# Patient Record
Sex: FEMALE | Race: WHITE | URBAN - METROPOLITAN AREA
[De-identification: names, ages, dates, MRNs, and addresses within clinical notes are randomized per-mention and may not be internally consistent; named-entity substitution may affect disease eponyms.]

---

## 2017-01-01 ENCOUNTER — HOSPITAL ENCOUNTER (OUTPATIENT)
Dept: LAB | Age: 82
Discharge: HOME | End: 2017-10-26
Payer: MEDICARE

## 2017-01-01 ENCOUNTER — PATIENT OUTREACH (OUTPATIENT)
Dept: FAMILY MEDICINE CLINIC | Age: 82
End: 2017-01-01

## 2017-01-01 ENCOUNTER — OFFICE VISIT (OUTPATIENT)
Dept: FAMILY MEDICINE CLINIC | Age: 82
End: 2017-01-01

## 2017-01-01 ENCOUNTER — TELEPHONE (OUTPATIENT)
Dept: FAMILY MEDICINE CLINIC | Age: 82
End: 2017-01-01

## 2017-01-01 VITALS
TEMPERATURE: 98.2 F | BODY MASS INDEX: 29.81 KG/M2 | HEIGHT: 62 IN | DIASTOLIC BLOOD PRESSURE: 58 MMHG | WEIGHT: 162 LBS | OXYGEN SATURATION: 97 % | RESPIRATION RATE: 16 BRPM | HEART RATE: 82 BPM | SYSTOLIC BLOOD PRESSURE: 110 MMHG

## 2017-01-01 VITALS
DIASTOLIC BLOOD PRESSURE: 60 MMHG | BODY MASS INDEX: 27.31 KG/M2 | OXYGEN SATURATION: 98 % | HEIGHT: 62 IN | HEART RATE: 82 BPM | WEIGHT: 148.4 LBS | RESPIRATION RATE: 20 BRPM | SYSTOLIC BLOOD PRESSURE: 100 MMHG | TEMPERATURE: 97.8 F

## 2017-01-01 VITALS
HEIGHT: 62 IN | RESPIRATION RATE: 20 BRPM | WEIGHT: 145 LBS | DIASTOLIC BLOOD PRESSURE: 70 MMHG | TEMPERATURE: 98.1 F | OXYGEN SATURATION: 97 % | BODY MASS INDEX: 26.68 KG/M2 | SYSTOLIC BLOOD PRESSURE: 128 MMHG | HEART RATE: 102 BPM

## 2017-01-01 DIAGNOSIS — R53.81 PHYSICAL DECONDITIONING: ICD-10-CM

## 2017-01-01 DIAGNOSIS — R10.32 LEFT LOWER QUADRANT PAIN: ICD-10-CM

## 2017-01-01 DIAGNOSIS — M15.9 PRIMARY OSTEOARTHRITIS INVOLVING MULTIPLE JOINTS: ICD-10-CM

## 2017-01-01 DIAGNOSIS — E03.9 HYPOTHYROIDISM (ACQUIRED): ICD-10-CM

## 2017-01-01 DIAGNOSIS — K52.9 COLITIS: Primary | ICD-10-CM

## 2017-01-01 DIAGNOSIS — Z79.899 HIGH RISK MEDICATION USE: ICD-10-CM

## 2017-01-01 DIAGNOSIS — R26.81 UNSTEADY GAIT: ICD-10-CM

## 2017-01-01 DIAGNOSIS — R63.4 UNINTENTIONAL WEIGHT LOSS: Primary | ICD-10-CM

## 2017-01-01 DIAGNOSIS — R19.7 DIARRHEA, UNSPECIFIED TYPE: ICD-10-CM

## 2017-01-01 DIAGNOSIS — I10 ESSENTIAL HYPERTENSION: Primary | ICD-10-CM

## 2017-01-01 DIAGNOSIS — K63.89 COLONIC MASS: ICD-10-CM

## 2017-01-01 DIAGNOSIS — Z00.00 ROUTINE GENERAL MEDICAL EXAMINATION AT A HEALTH CARE FACILITY: ICD-10-CM

## 2017-01-01 DIAGNOSIS — R19.04 LEFT LOWER QUADRANT ABDOMINAL MASS: ICD-10-CM

## 2017-01-01 DIAGNOSIS — R63.4 UNINTENTIONAL WEIGHT LOSS: ICD-10-CM

## 2017-01-01 LAB
ALBUMIN SERPL-MCNC: 2.4 G/DL (ref 3.4–5)
ALBUMIN/GLOB SERPL: 0.6 {RATIO} (ref 0.8–1.7)
ALP SERPL-CCNC: 90 U/L (ref 45–117)
ALT SERPL-CCNC: 28 U/L (ref 13–56)
ANION GAP SERPL CALC-SCNC: 11 MMOL/L (ref 3–18)
AST SERPL-CCNC: 32 U/L (ref 15–37)
BASOPHILS # BLD: 0 K/UL (ref 0–0.06)
BASOPHILS NFR BLD: 0 % (ref 0–2)
BILIRUB SERPL-MCNC: 0.6 MG/DL (ref 0.2–1)
BUN SERPL-MCNC: 26 MG/DL (ref 7–18)
BUN/CREAT SERPL: 18 (ref 12–20)
CALCIUM SERPL-MCNC: 8.3 MG/DL (ref 8.5–10.1)
CHLORIDE SERPL-SCNC: 98 MMOL/L (ref 100–108)
CO2 SERPL-SCNC: 28 MMOL/L (ref 21–32)
CREAT SERPL-MCNC: 1.46 MG/DL (ref 0.6–1.3)
DIFFERENTIAL METHOD BLD: ABNORMAL
EOSINOPHIL # BLD: 0 K/UL (ref 0–0.4)
EOSINOPHIL NFR BLD: 0 % (ref 0–5)
ERYTHROCYTE [DISTWIDTH] IN BLOOD BY AUTOMATED COUNT: 14.7 % (ref 11.6–14.5)
GLOBULIN SER CALC-MCNC: 4.2 G/DL (ref 2–4)
GLUCOSE SERPL-MCNC: 103 MG/DL (ref 74–99)
HCT VFR BLD AUTO: 34.7 % (ref 35–45)
HGB BLD-MCNC: 10.9 G/DL (ref 12–16)
LYMPHOCYTES # BLD: 2.9 K/UL (ref 0.9–3.6)
LYMPHOCYTES NFR BLD: 31 % (ref 21–52)
MCH RBC QN AUTO: 28.7 PG (ref 24–34)
MCHC RBC AUTO-ENTMCNC: 31.4 G/DL (ref 31–37)
MCV RBC AUTO: 91.3 FL (ref 74–97)
MONOCYTES # BLD: 0.8 K/UL (ref 0.05–1.2)
MONOCYTES NFR BLD: 9 % (ref 3–10)
NEUTS SEG # BLD: 5.5 K/UL (ref 1.8–8)
NEUTS SEG NFR BLD: 60 % (ref 40–73)
PLATELET # BLD AUTO: 369 K/UL (ref 135–420)
PMV BLD AUTO: 10.9 FL (ref 9.2–11.8)
POTASSIUM SERPL-SCNC: 3.4 MMOL/L (ref 3.5–5.5)
PROT SERPL-MCNC: 6.6 G/DL (ref 6.4–8.2)
RBC # BLD AUTO: 3.8 M/UL (ref 4.2–5.3)
SODIUM SERPL-SCNC: 137 MMOL/L (ref 136–145)
TSH SERPL DL<=0.05 MIU/L-ACNC: 2.55 UIU/ML (ref 0.36–3.74)
WBC # BLD AUTO: 9.2 K/UL (ref 4.6–13.2)

## 2017-01-01 PROCEDURE — 84443 ASSAY THYROID STIM HORMONE: CPT | Performed by: INTERNAL MEDICINE

## 2017-01-01 PROCEDURE — 80053 COMPREHEN METABOLIC PANEL: CPT | Performed by: INTERNAL MEDICINE

## 2017-01-01 PROCEDURE — 36415 COLL VENOUS BLD VENIPUNCTURE: CPT | Performed by: INTERNAL MEDICINE

## 2017-01-01 PROCEDURE — 85025 COMPLETE CBC W/AUTO DIFF WBC: CPT | Performed by: INTERNAL MEDICINE

## 2017-01-01 RX ORDER — OXYCODONE AND ACETAMINOPHEN 5; 325 MG/1; MG/1
1 TABLET ORAL
Qty: 30 TAB | Refills: 0 | Status: SHIPPED | OUTPATIENT
Start: 2017-01-01 | End: 2017-01-01 | Stop reason: SDUPTHER

## 2017-01-01 RX ORDER — AMOXICILLIN AND CLAVULANATE POTASSIUM 500; 125 MG/1; MG/1
1 TABLET, FILM COATED ORAL EVERY 12 HOURS
COMMUNITY
Start: 2017-01-01 | End: 2017-01-01

## 2017-01-01 RX ORDER — SULFAMETHOXAZOLE AND TRIMETHOPRIM 400; 80 MG/1; MG/1
1 TABLET ORAL EVERY 12 HOURS
COMMUNITY
Start: 2017-01-01 | End: 2017-01-01

## 2017-01-01 RX ORDER — LEVOTHYROXINE SODIUM 50 UG/1
50 TABLET ORAL
Qty: 90 TAB | Refills: 3 | Status: SHIPPED | OUTPATIENT
Start: 2017-01-01 | End: 2018-01-01 | Stop reason: SDUPTHER

## 2017-01-01 RX ORDER — OXYCODONE AND ACETAMINOPHEN 5; 325 MG/1; MG/1
1 TABLET ORAL
Qty: 30 TAB | Refills: 0 | Status: SHIPPED | OUTPATIENT
Start: 2017-01-01 | End: 2018-01-01 | Stop reason: SDUPTHER

## 2017-01-01 RX ORDER — METRONIDAZOLE 500 MG/1
500 TABLET ORAL EVERY 8 HOURS
COMMUNITY
Start: 2017-01-01 | End: 2017-01-01

## 2017-01-10 RX ORDER — LEVOTHYROXINE SODIUM 50 UG/1
50 TABLET ORAL
Qty: 90 TAB | Refills: 0 | Status: SHIPPED | OUTPATIENT
Start: 2017-01-10 | End: 2017-04-12 | Stop reason: SDUPTHER

## 2017-01-10 NOTE — TELEPHONE ENCOUNTER
From: Beverly Schwartz  To: Luisa Belle MD  Sent: 1/9/2017 10:02 AM EST  Subject: Medication Renewal Request    Original authorizing provider: MD Beverly Tomas would like a refill of the following medications:  levothyroxine (SYNTHROID) 50 mcg tablet Luisa Belle MD]    Preferred pharmacy: [address] AT [address] [place] [address]    Comment:

## 2017-01-19 ENCOUNTER — HOSPITAL ENCOUNTER (OUTPATIENT)
Dept: LAB | Age: 82
Discharge: HOME | End: 2017-01-19
Payer: MEDICARE

## 2017-01-19 ENCOUNTER — OFFICE VISIT (OUTPATIENT)
Dept: FAMILY MEDICINE CLINIC | Age: 82
End: 2017-01-19

## 2017-01-19 VITALS
SYSTOLIC BLOOD PRESSURE: 122 MMHG | TEMPERATURE: 97.6 F | HEART RATE: 87 BPM | OXYGEN SATURATION: 95 % | WEIGHT: 157.4 LBS | BODY MASS INDEX: 28.97 KG/M2 | DIASTOLIC BLOOD PRESSURE: 76 MMHG | HEIGHT: 62 IN | RESPIRATION RATE: 20 BRPM

## 2017-01-19 DIAGNOSIS — I10 ESSENTIAL HYPERTENSION: ICD-10-CM

## 2017-01-19 DIAGNOSIS — N17.9 AKI (ACUTE KIDNEY INJURY) (HCC): ICD-10-CM

## 2017-01-19 DIAGNOSIS — M19.90 OSTEOARTHRITIS, UNSPECIFIED OSTEOARTHRITIS TYPE, UNSPECIFIED SITE: ICD-10-CM

## 2017-01-19 DIAGNOSIS — N17.9 AKI (ACUTE KIDNEY INJURY) (HCC): Primary | ICD-10-CM

## 2017-01-19 LAB
ANION GAP BLD CALC-SCNC: 11 MMOL/L (ref 3–18)
BUN SERPL-MCNC: 29 MG/DL (ref 7–18)
BUN/CREAT SERPL: 20 (ref 12–20)
CALCIUM SERPL-MCNC: 9.3 MG/DL (ref 8.5–10.1)
CHLORIDE SERPL-SCNC: 98 MMOL/L (ref 100–108)
CO2 SERPL-SCNC: 28 MMOL/L (ref 21–32)
CREAT SERPL-MCNC: 1.46 MG/DL (ref 0.6–1.3)
GLUCOSE SERPL-MCNC: 107 MG/DL (ref 74–99)
POTASSIUM SERPL-SCNC: 3.8 MMOL/L (ref 3.5–5.5)
SODIUM SERPL-SCNC: 137 MMOL/L (ref 136–145)

## 2017-01-19 PROCEDURE — 36415 COLL VENOUS BLD VENIPUNCTURE: CPT | Performed by: INTERNAL MEDICINE

## 2017-01-19 PROCEDURE — 80048 BASIC METABOLIC PNL TOTAL CA: CPT | Performed by: INTERNAL MEDICINE

## 2017-01-19 RX ORDER — OXYCODONE AND ACETAMINOPHEN 5; 325 MG/1; MG/1
1 TABLET ORAL
Qty: 30 TAB | Refills: 0 | Status: SHIPPED | OUTPATIENT
Start: 2017-01-19 | End: 2017-04-12 | Stop reason: SDUPTHER

## 2017-01-19 NOTE — MR AVS SNAPSHOT
Visit Information Date & Time Provider Department Dept. Phone Encounter #  
 1/19/2017  1:00 PM Domi Navarro, [facility] [phone] [id number] Upcoming Health Maintenance Date Due  
 GLAUCOMA SCREENING Q2Y 6/17/2017 Pneumococcal 65+ Low/Medium Risk (2 of 2 - PPSV23) 11/1/2017 MEDICARE YEARLY EXAM 12/20/2017 DTaP/Tdap/Td series (2 - Td) 1/19/2027 Allergies as of 1/19/2017  Review Complete On: 1/19/2017 By: Domi Navarro MD  
  
 Severity Noted Reaction Type Reactions Ace Inhibitors  12/19/2016    Cough Current Immunizations  Reviewed on 9/2/2015 Name Date Influenza High Dose Vaccine PF 8/30/2016 10:42 AM, 9/2/2015 10:38 AM  
  
 Not reviewed this visit You Were Diagnosed With   
  
 Codes Comments [facility]    -  Primary ICD-10-CM: N17.9 ICD-9-CM: 584.9 Essential hypertension     ICD-10-CM: I10 
ICD-9-CM: 401.9 Vitals BP Pulse Temp Resp Height(growth percentile) Weight(growth percentile) 122/76 87 97.6 °F (36.4 °C) 20 5' 2\" (1.575 m) 157 lb 6.4 oz (71.4 kg) SpO2 BMI OB Status Smoking Status 95% 28.79 kg/m2 Postmenopausal Former Smoker Vitals History BMI and BSA Data Body Mass Index Body Surface Area 28.79 kg/m 2 1.77 m 2 Preferred Pharmacy Pharmacy Name Phone [facility] [phone] [facility] [address]  [address] [phone] Your Updated Medication List  
  
   
This list is accurate as of: 1/19/17  1:18 PM.  Always use your most recent med list.  
  
  
  
  
 aspirin delayed-release 81 mg tablet Take 1 Tab by mouth daily. carvedilol 6.25 mg tablet Commonly known as:  Kenyatta Golas Take  by mouth two (2) times daily (with meals). furosemide 40 mg tablet Commonly known as:  LASIX Take  by mouth daily. levothyroxine 50 mcg tablet Commonly known as:  SYNTHROID  
 Take 1 Tab by mouth Daily (before breakfast). losartan 25 mg tablet Commonly known as:  COZAAR Take  by mouth daily. MULTIPLE VITAMIN, WOMENS Tab Generic drug:  multivitamins-ca-iron-minerals Take  by mouth. oxyCODONE-acetaminophen 5-325 mg per tablet Commonly known as:  PERCOCET Take 1 Tab by mouth daily as needed for Pain. ZOCOR 20 mg tablet Generic drug:  simvastatin Take  by mouth nightly. To-Do List   
 01/19/2017 Lab:  METABOLIC PANEL, BASIC Introducing \[facility]\"" & HEALTH SERVICES! Dear Kj Felipe: 
Thank you for requesting a H[employer] account. Our records indicate that you already have an active HireHive account. You can access your account anytime at [email or web address] The Simple[employer] Fast and Elegant Tablet [employer] App". [employer]/"[employer] The Simple, Fast and Elegant Tablet [employer] App" Did you know that you can access your hospital and ER discharge instructions at any time in HireHive? You can also review all of your test results from your hospital stay or ER visit. Additional Information If you have questions, please visit the Frequently Asked Questions section of the HireHive website at [email or web address]/"[employer] The Simple, Fast and Elegant Tablet [employer] App"/. Remember, HireHive is NOT to be used for urgent needs. For medical emergencies, dial 911. Now available from your iPhone and Android! Please provide this summary of care documentation to your next provider. Your primary care clinician is listed as Nanda Smith. If you have any questions after today's visit, please call [phone].

## 2017-01-19 NOTE — PROGRESS NOTES
Estiven Schuster is a 80 y.o. female here today for 1 month follow-up for HTN. 1. Have you been to the ER, urgent care clinic since your last visit? Hospitalized since your last visit? No    2. Have you seen or consulted any other health care providers outside of the [address] [place] [address] since your last visit? Include any pap smears or colon screening.  Yes Where: Cardiologist

## 2017-01-19 NOTE — PROGRESS NOTES
Assessment/Plan:    1. ANANT (acute kidney injury) (HCC)-recheck labs  - METABOLIC PANEL, BASIC; Future    2. Essential hypertension  -cont current    3. Osteoarthritis, unspecified osteoarthritis type, unspecified site  -refilled. [facility]  [facility] and is in accordance with patient's prescriptions   - oxyCODONE-acetaminophen (PERCOCET) 5-325 mg per tablet; Take 1 Tab by mouth daily as needed for Pain. Dispense: 30 Tab; Refill: 0    The plan was discussed with the patient. The patient verbalized understanding and is in agreement with the plan. All medication potential side effects were discussed with the patient. Health Maintenance:   Health Maintenance   Topic Date Due    GLAUCOMA SCREENING Q2Y  06/17/2017    Pneumococcal 65+ Low/Medium Risk (2 of 2 - PPSV23) 11/01/2017    MEDICARE YEARLY EXAM  12/20/2017    DTaP/Tdap/Td series (2 - Td) 01/19/2027    OSTEOPOROSIS SCREENING (DEXA)  Completed    ZOSTER VACCINE AGE 60>  Completed    INFLUENZA AGE 9 TO ADULT  Completed       Kali Andrews is a 80 y.o. female and presents with Hypertension     Subjective:  MDD- started on wellbutrin at last visit. However, she had nausea and abd pain, so she didn't start it. She states she feels better now. She thinks it was just from the holidays and being in the hospital.    ROS:  Constitutional: No recent weight change. No weakness/fatigue. No f/c. Cardiovascular: No CP/palpitations. No RODRIGEZ/orthopnea/PND. Respiratory: No cough/sputum, dyspnea, wheezing. Gastointestinal: No dysphagia, reflux. No n/v. No constipation/diarrhea. No melena/rectal bleeding. Psychiatric:  No depression, anxiety. The problem list was updated as a part of today's visit.   Patient Active Problem List   Diagnosis Code    HTN (hypertension) I10    HLD (hyperlipidemia) E78.5    Osteopenia M85.80    Vitamin D deficiency E55.9    Post herpetic neuralgia B02.29    Basal cell cancer C44.91    Refusal of statin medication by patient Z53.29    Subclinical hypothyroidism E03.9    Hearing loss H91.90    Hypothyroidism (acquired) E03.9    Essential hypertension I10    Acute on chronic systolic congestive heart failure (HCC) I50.23       The PSH, FH were reviewed. SH:  Social History   Substance Use Topics    Smoking status: Former Smoker     Packs/day: 1.00     Years: 50.00     Start date: 1/1/1944     Quit date: 1/1/1994    Smokeless tobacco: Never Used    Alcohol use 0.0 oz/week     0 Standard drinks or equivalent per week      Comment: seldom       Medications/Allergies:  Current Outpatient Prescriptions on File Prior to Visit   Medication Sig Dispense Refill    levothyroxine (SYNTHROID) 50 mcg tablet Take 1 Tab by mouth Daily (before breakfast). 90 Tab 0    losartan (COZAAR) 25 mg tablet Take  by mouth daily.  buPROPion XL (WELLBUTRIN XL) 150 mg tablet Take 1 Tab by mouth every morning. 30 Tab 0    oxyCODONE-acetaminophen (PERCOCET) 5-325 mg per tablet Take 1 Tab by mouth daily as needed for Pain. 30 Tab 0    furosemide (LASIX) 40 mg tablet Take  by mouth daily.  carvedilol (COREG) 6.25 mg tablet Take  by mouth two (2) times daily (with meals).  simvastatin (ZOCOR) 20 mg tablet Take  by mouth nightly.  multivitamins-ca-iron-minerals (MULTIPLE VITAMIN, WOMENS) tab Take  by mouth.  aspirin delayed-release 81 mg tablet Take 1 Tab by mouth daily. 30 Tab      No current facility-administered medications on file prior to visit. Allergies   Allergen Reactions    Ace Inhibitors Cough       Objective:  Visit Vitals    /76    Pulse 87    Temp 97.6 °F (36.4 °C)    Resp 20    Ht 5' 2\" (1.575 m)    Wt 157 lb 6.4 oz (71.4 kg)    SpO2 95%    BMI 28.79 kg/m2      Constitutional: Well developed, nourished, no distress, alert   CV: S1, S2.  RRR. No murmurs/rubs. No thrills palpated. No carotid bruits. Intact distal pulses. No edema. Pulm: No abnormalities on inspection.   Clear to auscultation bilaterally. No wheezing/rhonchi. Normal effort. Labwork and Ancillary Studies:      Basic Metabolic Profile/LFTs  Lab Results   Component Value Date/Time    Sodium 142 10/19/2016 11:11 AM    Potassium 4.2 10/19/2016 11:11 AM    Chloride 105 10/19/2016 11:11 AM    CO2 27 10/19/2016 11:11 AM    Anion gap 10 10/19/2016 11:11 AM    Glucose 101 10/19/2016 11:11 AM    BUN 23 10/19/2016 11:11 AM    Creatinine 1.40 10/19/2016 11:11 AM    BUN/Creatinine ratio 16 10/19/2016 11:11 AM    GFR est AA 43 10/19/2016 11:11 AM    GFR est non-AA 35 10/19/2016 11:11 AM    Calcium 9.5 10/19/2016 11:11 AM      Lab Results   Component Value Date/Time    ALT 21 11/18/2015 12:00 PM    AST 18 11/18/2015 12:00 PM    Alk.  phosphatase 98 11/18/2015 12:00 PM    Bilirubin, total 0.6 11/18/2015 12:00 PM

## 2017-04-12 DIAGNOSIS — M19.90 OSTEOARTHRITIS, UNSPECIFIED OSTEOARTHRITIS TYPE, UNSPECIFIED SITE: ICD-10-CM

## 2017-04-12 RX ORDER — LEVOTHYROXINE SODIUM 50 UG/1
50 TABLET ORAL
Qty: 30 TAB | Refills: 0 | Status: SHIPPED | OUTPATIENT
Start: 2017-04-12 | End: 2017-04-14 | Stop reason: SDUPTHER

## 2017-04-12 RX ORDER — OXYCODONE AND ACETAMINOPHEN 5; 325 MG/1; MG/1
1 TABLET ORAL
Qty: 30 TAB | Refills: 0 | Status: SHIPPED | OUTPATIENT
Start: 2017-04-12 | End: 2017-01-01 | Stop reason: SDUPTHER

## 2017-04-13 ENCOUNTER — HOSPITAL ENCOUNTER (OUTPATIENT)
Dept: LAB | Age: 82
Discharge: HOME | End: 2017-04-13
Payer: MEDICARE

## 2017-04-13 DIAGNOSIS — R53.83 FATIGUE, UNSPECIFIED TYPE: ICD-10-CM

## 2017-04-13 LAB
ANION GAP BLD CALC-SCNC: 9 MMOL/L (ref 3–18)
BUN SERPL-MCNC: 31 MG/DL (ref 7–18)
BUN/CREAT SERPL: 22 (ref 12–20)
CALCIUM SERPL-MCNC: 9.4 MG/DL (ref 8.5–10.1)
CHLORIDE SERPL-SCNC: 105 MMOL/L (ref 100–108)
CO2 SERPL-SCNC: 29 MMOL/L (ref 21–32)
CREAT SERPL-MCNC: 1.42 MG/DL (ref 0.6–1.3)
ERYTHROCYTE [DISTWIDTH] IN BLOOD BY AUTOMATED COUNT: 14.6 % (ref 11.6–14.5)
GLUCOSE SERPL-MCNC: 97 MG/DL (ref 74–99)
HCT VFR BLD AUTO: 35.7 % (ref 35–45)
HGB BLD-MCNC: 11.1 G/DL (ref 12–16)
MCH RBC QN AUTO: 29.5 PG (ref 24–34)
MCHC RBC AUTO-ENTMCNC: 31.1 G/DL (ref 31–37)
MCV RBC AUTO: 94.9 FL (ref 74–97)
PLATELET # BLD AUTO: 245 K/UL (ref 135–420)
PMV BLD AUTO: 11.2 FL (ref 9.2–11.8)
POTASSIUM SERPL-SCNC: 3.8 MMOL/L (ref 3.5–5.5)
RBC # BLD AUTO: 3.76 M/UL (ref 4.2–5.3)
SODIUM SERPL-SCNC: 143 MMOL/L (ref 136–145)
TSH SERPL DL<=0.05 MIU/L-ACNC: 2.44 UIU/ML (ref 0.36–3.74)
WBC # BLD AUTO: 9.1 K/UL (ref 4.6–13.2)

## 2017-04-13 PROCEDURE — 84443 ASSAY THYROID STIM HORMONE: CPT | Performed by: INTERNAL MEDICINE

## 2017-04-13 PROCEDURE — 80048 BASIC METABOLIC PNL TOTAL CA: CPT | Performed by: INTERNAL MEDICINE

## 2017-04-13 PROCEDURE — 85027 COMPLETE CBC AUTOMATED: CPT | Performed by: INTERNAL MEDICINE

## 2017-04-13 PROCEDURE — 36415 COLL VENOUS BLD VENIPUNCTURE: CPT | Performed by: INTERNAL MEDICINE

## 2017-04-14 RX ORDER — LEVOTHYROXINE SODIUM 50 UG/1
50 TABLET ORAL
Qty: 90 TAB | Refills: 3 | Status: SHIPPED | OUTPATIENT
Start: 2017-04-14 | End: 2017-01-01 | Stop reason: SDUPTHER

## 2017-05-05 NOTE — TELEPHONE ENCOUNTER
From: Haydee Romano  To: Kathy Mao MD  Sent: 5/5/2017 2:36 PM EDT  Subject: Medication Renewal Request    Original authorizing provider: MD Haydee Blank would like a refill of the following medications:  levothyroxine (SYNTHROID) 50 mcg tablet Kathy Mao MD]    Preferred pharmacy: [address] AT [address] [place] [address]    Comment:

## 2017-07-12 PROBLEM — M15.9 PRIMARY OSTEOARTHRITIS INVOLVING MULTIPLE JOINTS: Status: ACTIVE | Noted: 2017-01-01

## 2017-07-12 PROBLEM — Z79.899 CONTROLLED SUBSTANCE AGREEMENT SIGNED: Status: ACTIVE | Noted: 2017-01-01

## 2017-07-12 NOTE — MR AVS SNAPSHOT
Visit Information Date & Time Provider Department Dept. Phone Encounter #  
 7/12/2017  1:00 PM Jose Cortez, Abelardo [facility] [phone] [id number] Upcoming Health Maintenance Date Due  
 GLAUCOMA SCREENING Q2Y 6/17/2017 INFLUENZA AGE 9 TO ADULT 8/1/2017 Pneumococcal 65+ Low/Medium Risk (2 of 2 - PPSV23) 11/1/2017 MEDICARE YEARLY EXAM 12/20/2017 DTaP/Tdap/Td series (2 - Td) 1/19/2027 Allergies as of 7/12/2017  Review Complete On: 7/12/2017 By: Dima Gasca LPN Severity Noted Reaction Type Reactions Ace Inhibitors  12/19/2016    Cough Current Immunizations  Reviewed on 9/2/2015 Name Date Influenza High Dose Vaccine PF 8/30/2016 10:42 AM, 9/2/2015 10:38 AM  
  
 Not reviewed this visit You Were Diagnosed With   
  
 Codes Comments Essential hypertension    -  Primary ICD-10-CM: I10 
ICD-9-CM: 401.9 Primary osteoarthritis involving multiple joints     ICD-10-CM: M15.0 ICD-9-CM: 715.09 Vitals BP Pulse Temp Resp Height(growth percentile) Weight(growth percentile) 110/58 82 98.2 °F (36.8 °C) 16 5' 2\" (1.575 m) 162 lb (73.5 kg) SpO2 BMI OB Status Smoking Status 97% 29.63 kg/m2 Postmenopausal Former Smoker Vitals History BMI and BSA Data Body Mass Index Body Surface Area  
 29.63 kg/m 2 1.79 m 2 Preferred Pharmacy Pharmacy Name Phone [facility] [phone]  [address] [phone] Your Updated Medication List  
  
   
This list is accurate as of: 7/12/17  1:15 PM.  Always use your most recent med list.  
  
  
  
  
 aspirin delayed-release 81 mg tablet Take 1 Tab by mouth daily. carvedilol 6.25 mg tablet Commonly known as:  Carrolyn Peabody Take  by mouth two (2) times daily (with meals). furosemide 40 mg tablet Commonly known as:  LASIX Take  by mouth daily. levothyroxine 50 mcg tablet Commonly known as:  SYNTHROID Take 1 Tab by mouth Daily (before breakfast). losartan 25 mg tablet Commonly known as:  COZAAR Take  by mouth daily. MULTIPLE VITAMIN, WOMENS Tab Generic drug:  multivitamins-ca-iron-minerals Take  by mouth. oxyCODONE-acetaminophen 5-325 mg per tablet Commonly known as:  PERCOCET Take 1 Tab by mouth daily as needed for Pain. ZOCOR 20 mg tablet Generic drug:  simvastatin Take  by mouth nightly. Prescriptions Printed Refills  
 oxyCODONE-acetaminophen (PERCOCET) 5-325 mg per tablet 0 Sig: Take 1 Tab by mouth daily as needed for Pain. Class: Print Route: Oral  
  
Introducing [facility] & HEALTH SERVICES! Dear Jany Frye: 
Thank you for requesting a [employer] account. Our records indicate that you already have an active [employer] account. You can access your account anytime at [email or web address] Did you know that you can access your hospital and ER discharge instructions at any time in ChipCare? You can also review all of your test results from your hospital stay or ER visit. Additional Information If you have questions, please visit the Frequently Asked Questions section of the [employer] website at [email or web address] Remember, ChipCare is NOT to be used for urgent needs. For medical emergencies, dial 911. Now available from your iPhone and Android! Please provide this summary of care documentation to your next provider. Your primary care clinician is listed as Nanda Smith. If you have any questions after today's visit, please call [phone].

## 2017-07-12 NOTE — PROGRESS NOTES
Assessment/Plan:    1. Essential hypertension  -controlled. No sx of acute CHF, appears to at her baseline. 2. Primary osteoarthritis involving multiple joints-refilled. Controlled substance contract signed. - oxyCODONE-acetaminophen (PERCOCET) 5-325 mg per tablet; Take 1 Tab by mouth daily as needed for Pain. Dispense: 30 Tab; Refill: 0    The plan was discussed with the patient. The patient verbalized understanding and is in agreement with the plan. All medication potential side effects were discussed with the patient. Health Maintenance:   Health Maintenance   Topic Date Due    GLAUCOMA SCREENING Q2Y  06/17/2017    INFLUENZA AGE 9 TO ADULT  08/01/2017    Pneumococcal 65+ Low/Medium Risk (2 of 2 - PPSV23) 11/01/2017    MEDICARE YEARLY EXAM  12/20/2017    DTaP/Tdap/Td series (2 - Td) 01/19/2027    OSTEOPOROSIS SCREENING (DEXA)  Completed    ZOSTER VACCINE AGE 60>  Completed       Karol Leahy is a 80 y.o. female and presents with Follow Up Chronic Condition     Subjective:  OA_ uses percocet sparingly, not every day. HTN - bp controlled. Does have significant CHF, EF 20. Notes chronic stable RODRIGEZ and PND, 2 pillow orthopnea x 1 year. ROS:  Constitutional: No recent weight change. No weakness/fatigue. No f/c. Skin: No rashes, change in nails/hair, itching   HENT: No HA, dizziness. No hearing loss/tinnitus. No nasal congestion/discharge. Eyes: No change in vision, double/blurred vision or eye pain/redness. Cardiovascular: No CP/palpitations.  + RODRIGEZ/+orthopnea/+PND. Respiratory: No cough/sputum, dyspnea, wheezing. Gastointestinal: No dysphagia, reflux. No n/v. No constipation/diarrhea. No melena/rectal bleeding. Genitourinary: No dysuria, urinary hesitancy, nocturia, hematuria. No incontinence. Musculoskeletal: + joint pain/stiffness. No muscle pain/tenderness. Endo: No heat/cold intolerance, no polyuria/polydypsia. Heme: No h/o anemia.   No easy bleeding/bruising. Allergy/Immunology: No seasonal rhinitis. Denies frequent colds, sinus/ear infections. Neurological: No seizures/numbness/weakness. No paresthesias. Psychiatric:  No depression, anxiety. The problem list was updated as a part of today's visit. Patient Active Problem List   Diagnosis Code    HTN (hypertension) I10    HLD (hyperlipidemia) E78.5    Osteopenia M85.80    Vitamin D deficiency E55.9    Post herpetic neuralgia B02.29    Basal cell cancer C44.91    Refusal of statin medication by patient Z53.29    Subclinical hypothyroidism E03.9    Hearing loss H91.90    Hypothyroidism (acquired) E03.9    Essential hypertension I10    Acute on chronic systolic congestive heart failure (HCC) I50.23     The PSH, FH were reviewed. SH:  Social History   Substance Use Topics    Smoking status: Former Smoker     Packs/day: 1.00     Years: 50.00     Start date: 1/1/1944     Quit date: 1/1/1994    Smokeless tobacco: Never Used    Alcohol use 0.0 oz/week     0 Standard drinks or equivalent per week      Comment: seldom       Medications/Allergies:  Current Outpatient Prescriptions on File Prior to Visit   Medication Sig Dispense Refill    levothyroxine (SYNTHROID) 50 mcg tablet Take 1 Tab by mouth Daily (before breakfast). 90 Tab 3    oxyCODONE-acetaminophen (PERCOCET) 5-325 mg per tablet Take 1 Tab by mouth daily as needed for Pain. 30 Tab 0    losartan (COZAAR) 25 mg tablet Take  by mouth daily.  furosemide (LASIX) 40 mg tablet Take  by mouth daily.  carvedilol (COREG) 6.25 mg tablet Take  by mouth two (2) times daily (with meals).  simvastatin (ZOCOR) 20 mg tablet Take  by mouth nightly.  multivitamins-ca-iron-minerals (MULTIPLE VITAMIN, WOMENS) tab Take  by mouth.  aspirin delayed-release 81 mg tablet Take 1 Tab by mouth daily. 30 Tab      No current facility-administered medications on file prior to visit.          Allergies   Allergen Reactions    Ace Inhibitors Cough       Objective:  Visit Vitals    /58    Pulse 82    Temp 98.2 °F (36.8 °C)    Resp 16    Ht 5' 2\" (1.575 m)    Wt 162 lb (73.5 kg)    SpO2 97%    BMI 29.63 kg/m2      Constitutional: Well developed, nourished, no distress, alert, obese habitus   CV: S1, S2.  RRR. No murmurs/rubs. No thrills palpated. No carotid bruits. Intact distal pulses. No edema. Pulm: No abnormalities on inspection. Clear to auscultation bilaterally. No wheezing/rhonchi. Normal effort. GI: Soft, nontender, nondistended. Normal active bowel sounds. Neuro: A/O x 3. No focal motor or sensory deficits.  Speech normal.

## 2017-07-12 NOTE — PROGRESS NOTES
Rashmi Samaniego is a 80 y.o. female here today for follow-up. 1. Have you been to the ER, urgent care clinic since your last visit? Hospitalized since your last visit? No    2. Have you seen or consulted any other health care providers outside of the [address] [place] [address] since your last visit? Include any pap smears or colon screening.  Yes Reason for visit: cardiology

## 2017-09-20 NOTE — TELEPHONE ENCOUNTER
From: Lj Adames  To: Betsey Mc MD  Sent: 9/20/2017 9:16 AM EDT  Subject: Medication Renewal Request    Original authorizing provider: MD Lj Sparks would like a refill of the following medications:  oxyCODONE-acetaminophen (PERCOCET) 5-325 mg per tablet Betsey Mc MD]    Preferred pharmacy: [address] AT [address] [place] [address]    Comment:

## 2017-10-26 NOTE — PROGRESS NOTES
Assessment/Plan:    1. Unintentional weight loss, diarrhea, LLQ abd pain and L sided abdominal mass- ddx includes colitis (ischemic or infectious) vs diverticulitis vs malignancy. Ck labs and STAT CT abd/pelvis. Consider mesenteric PVLs. - METABOLIC PANEL, COMPREHENSIVE; Future  - CBC WITH AUTOMATED DIFF; Future  - CT ABD PELV W WO CONT; Future  - TSH 3RD GENERATION; Future  - C. DIFFICILE/EPI PCR ([facility] Only); Future    2. Primary osteoarthritis involving multiple joints  -refilled  - oxyCODONE-acetaminophen (PERCOCET) 5-325 mg per tablet; Take 1 Tab by mouth daily as needed for Pain. Dispense: 30 Tab; Refill: 0    The plan was discussed with the patient. The patient verbalized understanding and is in agreement with the plan. All medication potential side effects were discussed with the patient. Health Maintenance:   Health Maintenance   Topic Date Due    GLAUCOMA SCREENING Q2Y  06/17/2017    INFLUENZA AGE 9 TO ADULT  08/01/2017    Pneumococcal 65+ Low/Medium Risk (2 of 2 - PPSV23) 11/01/2017    MEDICARE YEARLY EXAM  12/20/2017    DTaP/Tdap/Td series (2 - Td) 01/19/2027    OSTEOPOROSIS SCREENING (DEXA)  Completed    ZOSTER VACCINE AGE 60>  Completed       Shazia Angel is a 80 y.o. female and presents with Abdominal Pain (When eating/drinking); Extremity Weakness; Diarrhea; Nausea; Weight Loss; and Breathing Problem     Subjective:  Pt c/o couple week h/o nausea, L sided abd pain, watery, non-bloody diarrhea (after every meal). Seems to be triggered with eating. She has lost 16lb in 5 weeks, according to her son. +chills and night sweats. No vomiting. No recent antibiotic use. She has bowel movements/diarrhea that wake her up in the middle of the night. Her pain is 10/10. ROS:  Constitutional: No recent weight change. No weakness/fatigue.  + f/c. HENT: No HA, dizziness. No hearing loss/tinnitus. No nasal congestion/discharge.    Eyes: No change in vision, double/blurred vision or eye pain/redness. Cardiovascular: No CP/palpitations. No RODRIGEZ/orthopnea/PND. Respiratory: + cough/no sputum, dyspnea, wheezing. Gastointestinal: No dysphagia, reflux. +nausea. +diarrhea. No melena/rectal bleeding. Genitourinary: No dysuria, urinary hesitancy, nocturia, hematuria. No incontinence. Endo: No heat/cold intolerance, +polydypsia. The problem list was updated as a part of today's visit. Patient Active Problem List   Diagnosis Code    HLD (hyperlipidemia) E78.5    Osteopenia M85.80    Vitamin D deficiency E55.9    Post herpetic neuralgia B02.29    Basal cell cancer C44.91    Refusal of statin medication by patient Z53.29    Hearing loss H91.90    Hypothyroidism (acquired) E03.9    Essential hypertension I10    Acute on chronic systolic congestive heart failure (HCC) I50.23    Primary osteoarthritis involving multiple joints M15.0    Controlled substance agreement signed Z79.899       The PSH, FH were reviewed. SH:  Social History   Substance Use Topics    Smoking status: Former Smoker     Packs/day: 1.00     Years: 50.00     Start date: 1/1/1944     Quit date: 1/1/1994    Smokeless tobacco: Never Used    Alcohol use 0.0 oz/week     0 Standard drinks or equivalent per week      Comment: seldom       Medications/Allergies:  Current Outpatient Prescriptions on File Prior to Visit   Medication Sig Dispense Refill    oxyCODONE-acetaminophen (PERCOCET) 5-325 mg per tablet Take 1 Tab by mouth daily as needed for Pain. 30 Tab 0    levothyroxine (SYNTHROID) 50 mcg tablet Take 1 Tab by mouth Daily (before breakfast). 90 Tab 3    losartan (COZAAR) 25 mg tablet Take  by mouth daily.  furosemide (LASIX) 40 mg tablet Take  by mouth daily.  carvedilol (COREG) 6.25 mg tablet Take  by mouth two (2) times daily (with meals).  simvastatin (ZOCOR) 20 mg tablet Take  by mouth nightly.  multivitamins-ca-iron-minerals (MULTIPLE VITAMIN, WOMENS) tab Take  by mouth.       aspirin delayed-release 81 mg tablet Take 1 Tab by mouth daily. 30 Tab      No current facility-administered medications on file prior to visit. Allergies   Allergen Reactions    Ace Inhibitors Cough       Objective:  Visit Vitals    /60 (BP 1 Location: Left arm, BP Patient Position: Sitting)    Pulse 82    Temp 97.8 °F (36.6 °C) (Oral)    Resp 20    Ht 5' 2\" (1.575 m)    Wt 148 lb 6.4 oz (67.3 kg)    SpO2 98%    BMI 27.14 kg/m2      Constitutional: Well developed, nourished, no distress, alert   HENT: Exterior ears and tympanic membranes normal bilaterally. Supple neck. No thyromegaly or lymphadenopathy. Oropharynx clear and moist mucous membranes. Eyes: Conjunctiva normal. PERRL. CV: S1, S2.  RRR. No murmurs/rubs. No thrills palpated. No carotid bruits. Intact distal pulses. No edema. Pulm: No abnormalities on inspection. Clear to auscultation bilaterally. No wheezing/rhonchi. Normal effort. GI: Soft, mild epigastric tenderness, nondistended. Normal active bowel sounds.  +LUQ abd mass, mobile, about 4cm     Labwork and Ancillary Studies:    CBC w/Diff  Lab Results   Component Value Date/Time    WBC 9.1 04/13/2017 10:15 AM    HGB 11.1 04/13/2017 10:15 AM    PLATELET 520 71/92/9189 10:15 AM         Basic Metabolic Profile/LFTs  Lab Results   Component Value Date/Time    Sodium 143 04/13/2017 10:15 AM    Potassium 3.8 04/13/2017 10:15 AM    Chloride 105 04/13/2017 10:15 AM    CO2 29 04/13/2017 10:15 AM    Anion gap 9 04/13/2017 10:15 AM    Glucose 97 04/13/2017 10:15 AM    BUN 31 04/13/2017 10:15 AM    Creatinine 1.42 04/13/2017 10:15 AM    BUN/Creatinine ratio 22 04/13/2017 10:15 AM    GFR est AA 42 04/13/2017 10:15 AM    GFR est non-AA 35 04/13/2017 10:15 AM    Calcium 9.4 04/13/2017 10:15 AM

## 2017-10-26 NOTE — PROGRESS NOTES
Reyna Loaiza is a 80 y.o. female (: 1926) presenting to address:    Chief Complaint   Patient presents with    Abdominal Pain     When eating/drinking    Extremity Weakness    Diarrhea    Nausea    Weight Loss    Breathing Problem       Vitals:    10/26/17 1335   BP: 100/60   Pulse: 82   Resp: 20   Temp: 97.8 °F (36.6 °C)   TempSrc: Oral   SpO2: 98%   Weight: 148 lb 6.4 oz (67.3 kg)   Height: 5' 2\" (1.575 m)   PainSc:   0 - No pain       Learning Assessment:     Learning Assessment 2014   PRIMARY LEARNER Patient   HIGHEST LEVEL OF EDUCATION - PRIMARY LEARNER  4 YEARS OF COLLEGE   BARRIERS PRIMARY LEARNER NONE   CO-LEARNER CAREGIVER No   PRIMARY LANGUAGE ENGLISH   LEARNER PREFERENCE PRIMARY OTHER (COMMENT)   ANSWERED BY self   RELATIONSHIP SELF     Depression Screening:     PHQ over the last two weeks 2017   Little interest or pleasure in doing things Not at all   Feeling down, depressed or hopeless Not at all   Total Score PHQ 2 0   Trouble falling or staying asleep, or sleeping too much -   Feeling tired or having little energy -   Poor appetite or overeating -   Feeling bad about yourself - or that you are a failure or have let yourself or your family down -   Trouble concentrating on things such as school, work, reading or watching TV -   Moving or speaking so slowly that other people could have noticed; or the opposite being so fidgety that others notice -   Thoughts of being better off dead, or hurting yourself in some way -   PHQ 9 Score -     Fall Risk Assessment:     Fall Risk Assessment, last 12 mths 2017   Able to walk? Yes   Fall in past 12 months? Yes   Fall with injury? Yes   Number of falls in past 12 months 1   Fall Risk Score 2     Abuse Screening:     Abuse Screening Questionnaire 2015   Do you ever feel afraid of your partner? N   Are you in a relationship with someone who physically or mentally threatens you? N   Is it safe for you to go home?  Amy Hernandez Coordination of Care Questionaire:   1. Have you been to the ER, urgent care clinic since your last visit? Hospitalized since your last visit? NO    2. Have you seen or consulted any other health care providers outside of the [address] [place] [address] since your last visit? Include any pap smears or colon screening. NO    Advanced Directive:   1. Do you have an Advanced Directive? YES    2. Would you like information on Advanced Directives?  NO

## 2017-10-26 NOTE — MR AVS SNAPSHOT
Visit Information Date & Time Provider Department Dept. Phone Encounter #  
 10/26/2017  1:45 PM Leilani Stevenson, 3 [facility] [id number] Your Appointments 12/14/2017  1:00 PM  
Office Visit with Leilani Stevenson MD  
3 [facility] CTR-[facility]) Appt Note: F/U AND MWV  
 [address] [facility] [address] [phone] [address] Upcoming Health Maintenance Date Due  
 GLAUCOMA SCREENING Q2Y 6/17/2017 INFLUENZA AGE 9 TO ADULT 8/1/2017 Pneumococcal 65+ Low/Medium Risk (2 of 2 - PPSV23) 11/1/2017 MEDICARE YEARLY EXAM 12/20/2017 DTaP/Tdap/Td series (2 - Td) 1/19/2027 Allergies as of 10/26/2017  Review Complete On: 10/26/2017 By: Kenneth Aguilar Severity Noted Reaction Type Reactions Ace Inhibitors  12/19/2016    Cough Current Immunizations  Reviewed on 9/2/2015 Name Date Influenza High Dose Vaccine PF 8/30/2016 10:42 AM, 9/2/2015 10:38 AM  
  
 Not reviewed this visit You Were Diagnosed With   
  
 Codes Comments Unintentional weight loss    -  Primary ICD-10-CM: R63.4 ICD-9-CM: 783.21 Diarrhea, unspecified type     ICD-10-CM: R19.7 ICD-9-CM: 787.91 Left lower quadrant pain     ICD-10-CM: R10.32 
ICD-9-CM: 789.04 Left lower quadrant abdominal mass     ICD-10-CM: R19.04 
ICD-9-CM: 789.34 Primary osteoarthritis involving multiple joints     ICD-10-CM: M15.0 ICD-9-CM: 715.09 Vitals BP Pulse Temp Resp Height(growth percentile) Weight(growth percentile) 100/60 (BP 1 Location: Left arm, BP Patient Position: Sitting) 82 97.8 °F (36.6 °C) (Oral) 20 5' 2\" (1.575 m) 148 lb 6.4 oz (67.3 kg) SpO2 BMI OB Status Smoking Status 98% 27.14 kg/m2 Postmenopausal Former Smoker Vitals History BMI and BSA Data  Body Mass Index Body Surface Area  
 27.14 kg/m 2 1.72 m 2  
  
  
 Preferred Pharmacy Pharmacy Name Phone [facility] [phone] [facility] [address] [phone] Your Updated Medication List  
  
   
This list is accurate as of: 10/26/17  1:57 PM.  Always use your most recent med list.  
  
  
  
  
 aspirin delayed-release 81 mg tablet Take 1 Tab by mouth daily. carvedilol 6.25 mg tablet Commonly known as:  Phineas Hoar Take  by mouth two (2) times daily (with meals). furosemide 40 mg tablet Commonly known as:  LASIX Take  by mouth daily. levothyroxine 50 mcg tablet Commonly known as:  SYNTHROID Take 1 Tab by mouth Daily (before breakfast). losartan 25 mg tablet Commonly known as:  COZAAR Take  by mouth daily. MULTIPLE VITAMIN, WOMENS Tab Generic drug:  multivitamins-ca-iron-minerals Take  by mouth. oxyCODONE-acetaminophen 5-325 mg per tablet Commonly known as:  PERCOCET Take 1 Tab by mouth daily as needed for Pain. ZOCOR 20 mg tablet Generic drug:  simvastatin Take  by mouth nightly. Prescriptions Printed Refills  
 oxyCODONE-acetaminophen (PERCOCET) 5-325 mg per tablet 0 Sig: Take 1 Tab by mouth daily as needed for Pain. Class: Print Route: Oral  
  
To-Do List   
 10/26/2017 Microbiology:  C. DIFFICILE/EPI PCR   
  
 10/26/2017 Lab:  CBC WITH AUTOMATED DIFF   
  
 10/26/2017 Imaging:  CT ABD PELV W WO CONT   
  
 10/26/2017 Lab:  METABOLIC PANEL, COMPREHENSIVE   
  
 10/26/2017 Lab:  TSH 3RD GENERATION Introducing [facility] & HEALTH SERVICES! Dear Brittney Galloway: 
Thank you for requesting a [employer] account. Our records indicate that you already have an active [employer] account. You can access your account anytime at [email or web address] Did you know that you can access your hospital and ER discharge instructions at any time in [employer]?   You can also review all of your test results from your hospital stay or ER visit. Additional Information If you have questions, please visit the Frequently Asked Questions section of the [employer] website at [email or web address] Remember, [employer] is NOT to be used for urgent needs. For medical emergencies, dial 911. Now available from your iPhone and Android! Please provide this summary of care documentation to your next provider. Your primary care clinician is listed as Nanda Smith. If you have any questions after today's visit, please call [phone].

## 2017-10-27 NOTE — PROGRESS NOTES
On call physician, Dr. Anaid Wu received wet read CT abd showing colitis, possible perforation and possible mass. I attempted to call pt several times. We were able to reach the son and told him to take pt to hospital.  He agreed.

## 2017-10-30 PROBLEM — K57.92 DIVERTICULITIS: Status: ACTIVE | Noted: 2017-01-01

## 2017-10-30 NOTE — Clinical Note
Patient's RUBI appoint is 11/1/2017 @ 2. She did sign out AMA and did not see GI prior to leaving. State's she was not going to do any testing for cancer.  Son Witness AMA encounter

## 2017-10-31 NOTE — PROGRESS NOTES
.  Transitional Care Nurse Navigator Note:  Hospital Follow Up for [facility] ANIA PHILIPPE Admission from 10/27 - 29/2017 for ABDOMINAL PAIN   . RRAT score: 15 Moderate  Medical History:     Past Medical History:   Diagnosis Date    Cancer ([address]     Chicken pox     Hypercholesterolemia     Hypertension     Hypothyroid     Measles     Osteopenia        Patient presenting symptoms Abdominal Pains    Diagnosed with Diverticulitis. Admitted to Hospitalist Service with consults from GI. Consults recommended Patient signed out AMA. Course of current Hospitalization (referenced by Phoebe Beltrán MD   note):   A/P:  Diverticulitis v ischemia v ibd, v neoplasm :  Flagyl ceftraixone  GI consult  Follow clinical progress  Low residue diet  Pt states she understands the risks this could be cancer--would not want treatment for cancer.     Abscess:  Ceftriaxone and bactrim  Lesion with decreased pain   Code status:  Discussed options   Updated to DNR/DNI per pt wishes. Copy on file in office. Medication Reconciliation completed: YES    Barriers to care? Patient signed out AMA without seeing GI     Support System consists of: [address] Directive on file in EMR? DDNR on File      This represents Transitions of Care because NN spoke with patient within 1 business days of discharge. Pt's TCM follow up appt is scheduled with Dr. Kyle Hannon on Wednesday 11/01/2017 @ 2  which is within 3 days of discharge. Called patient on 10/30/2017 and verified with 2 identifiers. Patient asked if I don't mind to call back . Her son is the one that sets up her office visits. NN asked patient to have him asked for her. Son did call with f/u appointment but did not asked to speak to NN. NN called patient back. Care Mgmt assessment completed with the patient as well as the medication reconciliation. Patient state's I know that I have to complete all of the antibiotics. Her diarrhea is decreasing and no more severe abdominal pains. Patient did not elaborate on why she signed out AMA without GI work up. .  Goals        Post Hospitalization     Attends follow-up appointments as directed. Patient will attend [facility] appointment on 11/1/2017 with Dr Leonarda Lion and adherence of prescribed medication (ie. action, side effects, missed dose, etc.). Patient will complete antibiotics with in the 8 days as ordered  Augmentin 500-125 mg every 12 hours x 8 days ( 11/6/2017)  Bactrim 400-80 mg  every 12 hours x 8 days ( 11/6/2017)  Flagyl 500 mg every 8 hours x 6 days ( 11/4/2017)       Prevent complications post hospitalization. Patient will complete Antibiotics  Patient will come to [facility] follow up       Returns to baseline activity level.

## 2017-11-01 PROBLEM — K63.89 COLONIC MASS: Status: ACTIVE | Noted: 2017-01-01

## 2017-11-01 NOTE — PROGRESS NOTES
Matias Norwood is a 80 y.o. female is here for a hospital follow up. 1. Have you been to the ER, urgent care clinic since your last visit? Hospitalized since your last visit? SPA     2. Have you seen or consulted any other health care providers outside of the [address] [place] [address] since your last visit? Include any pap smears or colon screening.  No     Health Maintenance Due   Topic Date Due    GLAUCOMA SCREENING Q2Y  06/17/2017

## 2017-11-01 NOTE — MR AVS SNAPSHOT
Visit Information Date & Time Provider Department Dept. Phone Encounter #  
 11/1/2017  2:00 PM Jignesh Gambino [facility]  Your Appointments 12/14/2017  1:00 PM  
Office Visit with Jignesh Gambino MD  
[facility] CTR-[facility]) Appt Note: F/U AND MWV  
 [address] [facility] [address] [facility] [id number] [phone]  
  
   
 [address] [place] [address] Upcoming Health Maintenance Date Due  
 GLAUCOMA SCREENING Q2Y 6/17/2017 MEDICARE YEARLY EXAM 12/20/2017 DTaP/Tdap/Td series (2 - Td) 1/19/2027 Allergies as of 11/1/2017  Review Complete On: 11/1/2017 By: Nathaniel Ayala LPN Severity Noted Reaction Type Reactions Ace Inhibitors  12/19/2016    Cough Current Immunizations  Reviewed on 9/2/2015 Name Date Influenza High Dose Vaccine PF 8/30/2016 10:42 AM, 9/2/2015 10:38 AM  
 Influenza Vaccine 9/11/2016 12:00 AM, 9/22/2009 12:00 AM, 11/4/2008 12:00 AM  
 Influenza Vaccine (Quad) 9/27/2013 12:00 AM, 11/22/2011 12:00 AM  
 Influenza Vaccine PF 10/6/2017 12:00 AM  
 Novel Influenza-H1N1-09, All Formulations 12/29/2009 12:00 AM  
 Pneumococcal Polysaccharide (PPSV-23) 1/1/2007 12:00 AM  
 Zoster Vaccine, Live 5/10/2011 12:00 AM  
  
 Not reviewed this visit You Were Diagnosed With   
  
 Codes Comments Colitis    -  Primary ICD-10-CM: K52.9 ICD-9-CM: 558. 9 Physical deconditioning     ICD-10-CM: R53.81 ICD-9-CM: 799.3 Unsteady gait     ICD-10-CM: R26.81 
ICD-9-CM: 078. 2 Vitals BP Pulse Temp Resp Height(growth percentile) Weight(growth percentile) 128/70 (BP 1 Location: Left arm, BP Patient Position: Sitting) (!) 102 98.1 °F (36.7 °C) (Oral) 20 5' 2\" (1.575 m) 145 lb (65.8 kg) SpO2 BMI OB Status Smoking Status 97% 26.52 kg/m2 Postmenopausal Former Smoker BMI and BSA Data Body Mass Index Body Surface Area  
 26.52 kg/m 2 1.7 m 2 Preferred Pharmacy Pharmacy Name Phone [facility] [phone] [phone] Your Updated Medication List  
  
   
This list is accurate as of: 11/1/17  2:20 PM.  Always use your most recent med list.  
  
  
  
  
 amoxicillin-clavulanate 500-125 mg per tablet Commonly known as:  AUGMENTIN Take 1 Tab by mouth every twelve (12) hours. FOR 8 DAYS  
  
 aspirin delayed-release 81 mg tablet Take 1 Tab by mouth daily. carvedilol 6.25 mg tablet Commonly known as:  Irene Zaynab Take  by mouth two (2) times daily (with meals). furosemide 40 mg tablet Commonly known as:  LASIX Take  by mouth daily. levothyroxine 50 mcg tablet Commonly known as:  SYNTHROID Take 1 Tab by mouth Daily (before breakfast). losartan 25 mg tablet Commonly known as:  COZAAR Take  by mouth daily. metroNIDAZOLE 500 mg tablet Commonly known as:  FLAGYL Take 500 mg by mouth every eight (8) hours. FOR 6 DAYS MULTIPLE VITAMIN, WOMENS Tab Generic drug:  multivitamins-ca-iron-minerals Take  by mouth. oxyCODONE-acetaminophen 5-325 mg per tablet Commonly known as:  PERCOCET Take 1 Tab by mouth daily as needed for Pain. trimethoprim-sulfamethoxazole  mg per tablet Commonly known as:  Misty Mates Take 1 Tab by mouth every twelve (12) hours. FOR 8 DAYS  
  
 ZOCOR 20 mg tablet Generic drug:  simvastatin Take  by mouth nightly. We Performed the Following REFERRAL TO PHYSICAL THERAPY [[facility] Custom] Comments:  
 Eval and treat. Marlon PT. Referral Information Referral ID Referred By Referred To  
  
 [id number] Ada Sánchez V Not Available Visits Status Start Date End Date 1 New Request 11/1/17 11/1/18  If your referral has a status of pending review or denied, additional information will be sent to support the outcome of this decision. Introducing [facility] & HEALTH SERVICES! Dear Elizabeth January: 
Thank you for requesting a [employer] account. Our records indicate that you already have an active [employer] account. You can access your account anytime at [email or web address] Did you know that you can access your hospital and ER discharge instructions at any time in [employer]? You can also review all of your test results from your hospital stay or ER visit. Additional Information If you have questions, please visit the Frequently Asked Questions section of the [employer] website at [email or web address] Remember, [employer] is NOT to be used for urgent needs. For medical emergencies, dial 911. Now available from your iPhone and Android! Please provide this summary of care documentation to your next provider. Your primary care clinician is listed as Nanda Smith. If you have any questions after today's visit, please call [phone].

## 2017-11-01 NOTE — PROGRESS NOTES
Assessment/Plan:    1. Colitis  -resolving. Finish antibiotics. 2. Physical deconditioning, unsteady gait - she doesn't drive, is dependent for ambulation and transportation  - REFERRAL TO PHYSICAL THERAPY    3. Colonic mass  -declines further eval    The plan was discussed with the patient. The patient verbalized understanding and is in agreement with the plan. All medication potential side effects were discussed with the patient. Health Maintenance:   Health Maintenance   Topic Date Due    GLAUCOMA SCREENING Q2Y  06/17/2017    MEDICARE YEARLY EXAM  12/20/2017    DTaP/Tdap/Td series (2 - Td) 01/19/2027    OSTEOPOROSIS SCREENING (DEXA)  Completed    ZOSTER VACCINE AGE 60>  Completed    Pneumococcal 65+ Low/Medium Risk  Completed    INFLUENZA AGE 9 TO ADULT  Completed       Kali Andrews is a 80 y.o. female and presents with Hospital Follow Up     Subjective:  Pt was admitted to the hospital for possible pneumotosis and obstructing mass with associated colitis. She was treated with cipro/flaygl, which improved her sx. She declined further eval, realizing that there is a possible mass/neoplasm. She doesn't wish to do a colonoscopy. She continues to have diarrhea, although the stool is becoming in more solid. Her pain has completely resolved. She has become generalized weaker and more unsteady on her feet. She no longer drives and is dependent for transportation. She walks with a cane, but has difficulty. ROS:  Constitutional: No recent weight change. No weakness/fatigue. No f/c. Cardiovascular: No CP/palpitations. No RODRIGEZ/orthopnea/PND. Respiratory: No cough/sputum, dyspnea, wheezing. Gastointestinal: No dysphagia, reflux. No n/v. No constipation/diarrhea. No melena/rectal bleeding. Psychiatric:  No depression, anxiety. The problem list was updated as a part of today's visit.   Patient Active Problem List   Diagnosis Code    HLD (hyperlipidemia) E78.5    Osteopenia M85.80    Vitamin D deficiency E55.9    Post herpetic neuralgia B02.29    Basal cell cancer C44.91    Refusal of statin medication by patient Z53.29    Hearing loss H91.90    Hypothyroidism (acquired) E03.9    Essential hypertension I10    Acute on chronic systolic congestive heart failure (HCC) I50.23    Primary osteoarthritis involving multiple joints M15.0    Controlled substance agreement signed Z79.899    Diverticulitis K57.92       The PSH, FH were reviewed. SH:  Social History   Substance Use Topics    Smoking status: Former Smoker     Packs/day: 1.00     Years: 50.00     Start date: 1/1/1944     Quit date: 1/1/1994    Smokeless tobacco: Never Used    Alcohol use 0.0 oz/week     0 Standard drinks or equivalent per week      Comment: seldom       Medications/Allergies:  Current Outpatient Prescriptions on File Prior to Visit   Medication Sig Dispense Refill    amoxicillin-clavulanate (AUGMENTIN) 500-125 mg per tablet Take 1 Tab by mouth every twelve (12) hours. FOR 8 DAYS      metroNIDAZOLE (FLAGYL) 500 mg tablet Take 500 mg by mouth every eight (8) hours. FOR 6 DAYS      trimethoprim-sulfamethoxazole (BACTRIM, SEPTRA)  mg per tablet Take 1 Tab by mouth every twelve (12) hours. FOR 8 DAYS      oxyCODONE-acetaminophen (PERCOCET) 5-325 mg per tablet Take 1 Tab by mouth daily as needed for Pain. 30 Tab 0    levothyroxine (SYNTHROID) 50 mcg tablet Take 1 Tab by mouth Daily (before breakfast). 90 Tab 3    losartan (COZAAR) 25 mg tablet Take  by mouth daily.  furosemide (LASIX) 40 mg tablet Take  by mouth daily.  carvedilol (COREG) 6.25 mg tablet Take  by mouth two (2) times daily (with meals).  simvastatin (ZOCOR) 20 mg tablet Take  by mouth nightly.  multivitamins-ca-iron-minerals (MULTIPLE VITAMIN, WOMENS) tab Take  by mouth.  aspirin delayed-release 81 mg tablet Take 1 Tab by mouth daily.  30 Tab      No current facility-administered medications on file prior to visit. Allergies   Allergen Reactions    Ace Inhibitors Cough       Objective:  Visit Vitals    /70 (BP 1 Location: Left arm, BP Patient Position: Sitting)    Pulse (!) 102    Temp 98.1 °F (36.7 °C) (Oral)    Resp 20    Ht 5' 2\" (1.575 m)    Wt 145 lb (65.8 kg)    SpO2 97%    BMI 26.52 kg/m2      Constitutional: Well developed, nourished, no distress, alert   CV: S1, S2.  RRR. No murmurs/rubs. No thrills palpated. No carotid bruits. Intact distal pulses. No edema. Pulm: No abnormalities on inspection. Clear to auscultation bilaterally. No wheezing/rhonchi. Normal effort. GI: Soft, nontender, nondistended. Normal active bowel sounds. +mass on L mid abdomen. MS: Gait unsteady with cane. Psych: Appropriate affect, judgement and insight. Short-term memory intact. CT abd pelvis:Nonspecific long segment of colitis in the descending colon. Questionable pneumatosis. Also question stricture or potential mass at the proximal sigmoid colon. Differential diagnosis will include diverticulitis, infectious/inflammatory colitis, or obstructing colitis from stricture/mass. Ischemic colitis less likely. Followup study, further evaluation with colonoscopy recommended. No abscess. No additional acute abnormalities.

## 2017-11-10 NOTE — PROGRESS NOTES
Patient Outreach made to patient. She states she has completed the antibiotic. She is still having some diarrhea but it has improved. She getting her appetite back and starting to eat solid foods. She declines needing any assistance at this time. She would like to think  for everything.

## 2017-11-17 NOTE — PROGRESS NOTES
Patient returned outreach called. She states she is doing well. Still having some diarrhea. She cancelled her appointment with Rick Mcmanus. She state she has to set up transportation and then will call to reschedule.

## 2017-11-27 NOTE — TELEPHONE ENCOUNTER
From: Shannan Andersen  To: Ines Barboza MD  Sent: 11/25/2017 11:08 AM EST  Subject: Medication Renewal Request    Original authorizing provider: MD Shannan Romano would like a refill of the following medications:  oxyCODONE-acetaminophen (PERCOCET) 5-325 mg per tablet Ines Barboza MD]    Preferred pharmacy: [place] [address]  [facility]    Comment:

## 2017-12-18 NOTE — TELEPHONE ENCOUNTER
Pt appreciates the refill and that you \"understand where she is coming from \". She wanted you to be aware she has regained 4 lbs and the diarrhea has subsided.

## 2017-12-18 NOTE — TELEPHONE ENCOUNTER
From: Ally Dukes  To: Jignesh Gambino MD  Sent: 12/18/2017 3:23 PM EST  Subject: Medication Renewal Request    Original authorizing provider: MD Ally Vee would like a refill of the following medications:  oxyCODONE-acetaminophen (PERCOCET) 5-325 mg per tablet Jignesh Gambino MD]    Preferred pharmacy: [place] [address]  [facility]    Comment:

## 2018-01-01 ENCOUNTER — OFFICE VISIT (OUTPATIENT)
Dept: FAMILY MEDICINE CLINIC | Age: 83
End: 2018-01-01

## 2018-01-01 ENCOUNTER — TELEPHONE (OUTPATIENT)
Dept: FAMILY MEDICINE CLINIC | Age: 83
End: 2018-01-01

## 2018-01-01 VITALS
RESPIRATION RATE: 20 BRPM | WEIGHT: 140 LBS | HEART RATE: 74 BPM | SYSTOLIC BLOOD PRESSURE: 92 MMHG | BODY MASS INDEX: 25.76 KG/M2 | TEMPERATURE: 97.7 F | HEIGHT: 62 IN | OXYGEN SATURATION: 97 % | DIASTOLIC BLOOD PRESSURE: 58 MMHG

## 2018-01-01 VITALS
OXYGEN SATURATION: 97 % | BODY MASS INDEX: 27.42 KG/M2 | HEIGHT: 62 IN | RESPIRATION RATE: 20 BRPM | TEMPERATURE: 98.1 F | HEART RATE: 80 BPM | WEIGHT: 149 LBS | SYSTOLIC BLOOD PRESSURE: 110 MMHG | DIASTOLIC BLOOD PRESSURE: 64 MMHG

## 2018-01-01 DIAGNOSIS — I50.22 SYSTOLIC CHF, CHRONIC (HCC): ICD-10-CM

## 2018-01-01 DIAGNOSIS — M15.9 PRIMARY OSTEOARTHRITIS INVOLVING MULTIPLE JOINTS: ICD-10-CM

## 2018-01-01 DIAGNOSIS — Z66 DNR (DO NOT RESUSCITATE): ICD-10-CM

## 2018-01-01 DIAGNOSIS — Z00.00 MEDICARE ANNUAL WELLNESS VISIT, SUBSEQUENT: Primary | ICD-10-CM

## 2018-01-01 DIAGNOSIS — S32.9XXA CLOSED NONDISPLACED FRACTURE OF PELVIS, UNSPECIFIED PART OF PELVIS, INITIAL ENCOUNTER (HCC): Primary | ICD-10-CM

## 2018-01-01 DIAGNOSIS — S32.9XXA CLOSED NONDISPLACED FRACTURE OF PELVIS, UNSPECIFIED PART OF PELVIS, INITIAL ENCOUNTER (HCC): ICD-10-CM

## 2018-01-01 DIAGNOSIS — I95.2 HYPOTENSION DUE TO DRUGS: ICD-10-CM

## 2018-01-01 DIAGNOSIS — I10 ESSENTIAL HYPERTENSION: ICD-10-CM

## 2018-01-01 RX ORDER — LOSARTAN POTASSIUM 25 MG/1
12.5 TABLET ORAL DAILY
Qty: 45 TAB | Refills: 0
Start: 2018-01-01 | End: 2018-01-01 | Stop reason: SDUPTHER

## 2018-01-01 RX ORDER — OXYCODONE AND ACETAMINOPHEN 5; 325 MG/1; MG/1
1 TABLET ORAL
Qty: 90 TAB | Refills: 0 | Status: SHIPPED | OUTPATIENT
Start: 2018-01-01 | End: 2018-01-01 | Stop reason: SDUPTHER

## 2018-01-01 RX ORDER — OXYCODONE AND ACETAMINOPHEN 5; 325 MG/1; MG/1
1 TABLET ORAL
Qty: 90 TAB | Refills: 0 | Status: SHIPPED | OUTPATIENT
Start: 2018-01-01 | End: 2018-07-29

## 2018-01-01 RX ORDER — LOSARTAN POTASSIUM 25 MG/1
12.5 TABLET ORAL DAILY
Qty: 45 TAB | Refills: 0 | Status: SHIPPED | OUTPATIENT
Start: 2018-01-01

## 2018-01-01 RX ORDER — MORPHINE SULFATE 15 MG/1
TABLET ORAL
COMMUNITY
End: 2018-01-01 | Stop reason: ALTCHOICE

## 2018-01-01 RX ORDER — MORPHINE SULFATE 15 MG/1
15 TABLET, FILM COATED, EXTENDED RELEASE ORAL EVERY 12 HOURS
Qty: 60 TAB | Refills: 0 | Status: SHIPPED | OUTPATIENT
Start: 2018-01-01 | End: 2018-01-01 | Stop reason: SDUPTHER

## 2018-01-01 RX ORDER — LEVOTHYROXINE SODIUM 50 UG/1
50 TABLET ORAL
Qty: 90 TAB | Refills: 3 | Status: SHIPPED | OUTPATIENT
Start: 2018-01-01

## 2018-01-01 RX ORDER — ACETAMINOPHEN 325 MG/1
TABLET ORAL
COMMUNITY
End: 2018-01-01 | Stop reason: ALTCHOICE

## 2018-01-01 RX ORDER — MORPHINE SULFATE 15 MG/1
15 TABLET, FILM COATED, EXTENDED RELEASE ORAL EVERY 12 HOURS
Qty: 60 TAB | Refills: 0 | Status: SHIPPED | OUTPATIENT
Start: 2018-01-01

## 2018-01-17 NOTE — TELEPHONE ENCOUNTER
From: Tye Wetzel  To: Baldo Dance, MD  Sent: 1/17/2018 12:08 PM EST  Subject: Medication Renewal Request    Original authorizing provider: Baldo Dance, MD Claudius Nose would like a refill of the following medications:  oxyCODONE-acetaminophen (PERCOCET) 5-325 mg per tablet Baldo Dance, MD]    Preferred pharmacy: [place] [address] [address] [facility]  [facility]    Comment:

## 2018-02-22 PROBLEM — Z66 DNR (DO NOT RESUSCITATE): Status: ACTIVE | Noted: 2018-01-01

## 2018-02-22 NOTE — PATIENT INSTRUCTIONS

## 2018-02-22 NOTE — MR AVS SNAPSHOT
Meagan Vera 
 
 
 [address] [facility] [id number] [phone] Patient: Santiago Langston MRN: [id number] Visit Information Date & Time Provider Department Dept. Phone Encounter #  
 2018  1:00 PM Newton Idalia, Abelardo Ambrocio [phone] [id number] Upcoming Health Maintenance Date Due  
 GLAUCOMA SCREENING Q2Y 2017 MEDICARE YEARLY EXAM 2019 DTaP/Tdap/Td series (2 - Td) 2027 Allergies as of 2018  Review Complete On: 2018 By: Newton Friedman MD  
  
 Severity Noted Reaction Type Reactions Ace Inhibitors  2016    Cough Current Immunizations  Reviewed on 2015 Name Date Influenza High Dose Vaccine PF 2016 10:42 AM, 2015 10:38 AM  
 Influenza Vaccine 2016 12:00 AM, 2009 12:00 AM, 2008 12:00 AM  
 Influenza Vaccine (Quad) 2013 12:00 AM, 2011 12:00 AM  
 Influenza Vaccine PF 10/6/2017 12:00 AM  
 Novel Influenza-H1N1-09, All Formulations 2009 12:00 AM  
 Pneumococcal Polysaccharide (PPSV-23) 2007 12:00 AM  
 Zoster Vaccine, Live 5/10/2011 12:00 AM  
  
 Not reviewed this visit You Were Diagnosed With   
  
 Codes Comments Medicare annual wellness visit, subsequent    -  Primary ICD-10-CM: Z00.00 ICD-9-CM: V70.0 DNR (do not resuscitate)     ICD-10-CM: U01 ICD-9-CM: V49.86 Vitals BP Pulse Temp Resp Height(growth percentile) Weight(growth percentile) 110/64 (BP 1 Location: Left arm, BP Patient Position: Sitting) 80 98.1 °F (36.7 °C) (Oral) 20 5' 2\" (1.575 m) 149 lb (67.6 kg) SpO2 BMI OB Status Smoking Status 97% 27.25 kg/m2 Postmenopausal Former Smoker Vitals History BMI and BSA Data Body Mass Index Body Surface Area  
 27.25 kg/m 2 1.72 m 2 Preferred Pharmacy Pharmacy Name Phone [facility] [phone] [phone] Your Updated Medication List  
  
   
This list is accurate as of 2/22/18  1:13 PM.  Always use your most recent med list.  
  
  
  
  
 aspirin delayed-release 81 mg tablet Take 1 Tab by mouth daily. carvedilol 6.25 mg tablet Commonly known as:  Laneta Rucks Take  by mouth two (2) times daily (with meals). furosemide 40 mg tablet Commonly known as:  LASIX Take  by mouth daily. levothyroxine 50 mcg tablet Commonly known as:  SYNTHROID Take 1 Tab by mouth Daily (before breakfast). losartan 25 mg tablet Commonly known as:  COZAAR Take  by mouth daily. MULTIPLE VITAMIN, WOMENS Tab Generic drug:  multivitamins-ca-iron-minerals Take  by mouth. oxyCODONE-acetaminophen 5-325 mg per tablet Commonly known as:  PERCOCET Take 1 Tab by mouth daily as needed for Pain for up to 90 days. ZOCOR 20 mg tablet Generic drug:  simvastatin Take  by mouth nightly. Patient Instructions Medicare Wellness Visit, Female The best way to live healthy is to have a healthy lifestyle by eating a well-balanced diet, exercising regularly, limiting alcohol and stopping smoking. Regular physical exams and screening tests are another way to keep healthy. Preventive exams provided by your health care provider can find health problems before they become diseases or illnesses. Preventive services including immunizations, screening tests, monitoring and exams can help you take care of your own health. All people over age 72 should have a pneumovax  and and a prevnar shot to prevent pneumonia. These are once in a lifetime unless you and your provider decide differently. All people over 65 should have a yearly flu shot and a tetanus vaccine every 10 years.  
 
A bone mass density to screen for osteoporosis or thinning of the bones should be done every 2 years after 72. Screening for diabetes mellitus with a blood sugar test should be done every year. Glaucoma is a disease of the eye due to increased ocular pressure that can lead to blindness and it should be done every year by an eye professional. 
 
Cardiovascular screening tests that check for elevated lipids (fatty part of blood) which can lead to heart disease and strokes should be done every 5 years. Colorectal screening that evaluates for blood or polyps in your colon should be done yearly as a stool test or every five years as a flexible sigmoidoscope or every 10 years as a colonoscopy up to age 76. Breast cancer screening with a mammogram is recommended biennially  for women age 54-69. Screening for cervical cancer with a pap smear and pelvic exam is recommended for women after age 72 years every 2 years up to age 79 or when the provider and patient decide to stop. If there is a history of cervical abnormalities or other increased risk for cancer then the test is recommended yearly. Hepatitis C screening is also recommended for anyone born between 80 through [address]. A shingles vaccine is also recommended once in a lifetime after age 61. Your Medicare Wellness Exam is recommended annually. Here is a list of your current Health Maintenance items with a due date: 
Health Maintenance Due Topic Date Due  Glaucoma Screening   06/17/2017 [facility] Annual Well Visit  12/20/2017 Introducing [facility]! [facility]: 
Thank you for requesting a [employer] account. Our records indicate that you already have an active [employer] account. You can access your account anytime at [email or web address] Did you know that you can access your hospital and ER discharge instructions at any time in [employer]? You can also review all of your test results from your hospital stay or ER visit. Additional Information If you have questions, please visit the Frequently Asked Questions section of the [email or web address] website at [email or web address] Remember, [employer] is NOT to be used for urgent needs. For medical emergencies, dial 911. Now available from your iPhone and Android! Please provide this summary of care documentation to your next provider. Your primary care clinician is listed as Nanda Smith. If you have any questions after today's visit, please call [phone].

## 2018-02-22 NOTE — PROGRESS NOTES
Tye Wetzel is a 80 y.o. female (: 1926) presenting to address:    Chief Complaint   Patient presents with    Hypertension       Vitals:    18 1255   BP: 110/64   Pulse: 80   Resp: 20   Temp: 98.1 °F (36.7 °C)   TempSrc: Oral   SpO2: 97%   Weight: 149 lb (67.6 kg)   Height: 5' 2\" (1.575 m)   PainSc:   5   PainLoc: Back       Hearing/Vision:      Visual Acuity Screening    Right eye Left eye Both eyes   Without correction: 20/40 20/30 20/25-1   With correction:          Learning Assessment:     Learning Assessment 2014   PRIMARY LEARNER Patient   HIGHEST LEVEL OF EDUCATION - PRIMARY LEARNER  4 YEARS OF COLLEGE   BARRIERS PRIMARY LEARNER NONE   CO-LEARNER CAREGIVER No   PRIMARY LANGUAGE ENGLISH   LEARNER PREFERENCE PRIMARY OTHER (COMMENT)   ANSWERED BY self   RELATIONSHIP SELF     Depression Screening:     PHQ over the last two weeks 2018   Little interest or pleasure in doing things Not at all   Feeling down, depressed or hopeless Not at all   Total Score PHQ 2 0   Trouble falling or staying asleep, or sleeping too much -   Feeling tired or having little energy -   Poor appetite or overeating -   Feeling bad about yourself - or that you are a failure or have let yourself or your family down -   Trouble concentrating on things such as school, work, reading or watching TV -   Moving or speaking so slowly that other people could have noticed; or the opposite being so fidgety that others notice -   Thoughts of being better off dead, or hurting yourself in some way -   PHQ 9 Score -     Fall Risk Assessment:     Fall Risk Assessment, last 12 mths 2018   Able to walk? Yes   Fall in past 12 months? No   Fall with injury? -   Number of falls in past 12 months -   Fall Risk Score -     Abuse Screening:     Abuse Screening Questionnaire 2015   Do you ever feel afraid of your partner? N   Are you in a relationship with someone who physically or mentally threatens you?  N   Is it safe for you to go home? Y     Coordination of Care Questionaire:   1. Have you been to the ER, urgent care clinic since your last visit? Hospitalized since your last visit? NO    2. Have you seen or consulted any other health care providers outside of the [address] [place] [address] since your last visit? Include any pap smears or colon screening. NO    Advanced Directive:   1. Do you have an Advanced Directive? YES    2. Would you like information on Advanced Directives?  NO

## 2018-02-22 NOTE — PROGRESS NOTES
This is a Subsequent Medicare Annual Wellness Exam (AWV) (Performed 12 months after IPPE or effective date of Medicare Part B enrollment)    I have reviewed the patient's medical history in detail and updated the computerized patient record. History     Past Medical History:   Diagnosis Date    Cancer ([address]     Chicken pox     Hypercholesterolemia     Hypertension     Hypothyroid     Measles     Osteopenia       Past Surgical History:   Procedure Laterality Date    HX APPENDECTOMY      HX CATARACT REMOVAL      HX CATARACT REMOVAL      HX DILATION AND CURETTAGE      HX TONSILLECTOMY       Current Outpatient Prescriptions   Medication Sig Dispense Refill    oxyCODONE-acetaminophen (PERCOCET) 5-325 mg per tablet Take 1 Tab by mouth daily as needed for Pain for up to 90 days. 90 Tab 0    levothyroxine (SYNTHROID) 50 mcg tablet Take 1 Tab by mouth Daily (before breakfast). 90 Tab 3    losartan (COZAAR) 25 mg tablet Take  by mouth daily.  furosemide (LASIX) 40 mg tablet Take  by mouth daily.  carvedilol (COREG) 6.25 mg tablet Take  by mouth two (2) times daily (with meals).  simvastatin (ZOCOR) 20 mg tablet Take  by mouth nightly.  multivitamins-ca-iron-minerals (MULTIPLE VITAMIN, WOMENS) tab Take  by mouth.  aspirin delayed-release 81 mg tablet Take 1 Tab by mouth daily.  30 Tab      Allergies   Allergen Reactions    Ace Inhibitors Cough     Family History   Problem Relation Age of Onset    Cancer Other      pancreatic    Hypertension Mother     Cancer Mother      breast     Social History   Substance Use Topics    Smoking status: Former Smoker     Packs/day: 1.00     Years: 50.00     Start date: 1/1/1944     Quit date: 1/1/1994    Smokeless tobacco: Never Used    Alcohol use 0.0 oz/week     0 Standard drinks or equivalent per week      Comment: seldom     Patient Active Problem List   Diagnosis Code    HLD (hyperlipidemia) E78.5    Osteopenia M85.80    Vitamin D deficiency E55.9    Post herpetic neuralgia B02.29    Basal cell cancer C44.91    Refusal of statin medication by patient Z53.29    Hearing loss H91.90    Hypothyroidism (acquired) E03.9    Essential hypertension I10    Acute on chronic systolic congestive heart failure (HCC) I50.23    Primary osteoarthritis involving multiple joints M15.0    Controlled substance agreement signed Z79.899    Diverticulitis K57.92    Colonic mass K63.9       Depression Risk Factor Screening:     PHQ over the last two weeks 2/22/2018   Little interest or pleasure in doing things Not at all   Feeling down, depressed or hopeless Not at all   Total Score PHQ 2 0   Trouble falling or staying asleep, or sleeping too much -   Feeling tired or having little energy -   Poor appetite or overeating -   Feeling bad about yourself - or that you are a failure or have let yourself or your family down -   Trouble concentrating on things such as school, work, reading or watching TV -   Moving or speaking so slowly that other people could have noticed; or the opposite being so fidgety that others notice -   Thoughts of being better off dead, or hurting yourself in some way -   PHQ 9 Score -     Alcohol Risk Factor Screening: You do not drink alcohol or very rarely. Functional Ability and Level of Safety:   Hearing Loss  Hearing is good. Activities of Daily Living  The home contains: handrails  Patient needs help with:  housework    Fall Risk  Fall Risk Assessment, last 12 mths 2/22/2018   Able to walk? Yes   Fall in past 12 months?  No   Fall with injury? -   Number of falls in past 12 months -   Fall Risk Score -     Abuse Screen  Patient is not abused    Cognitive Screening   Evaluation of Cognitive Function:  Has your family/caregiver stated any concerns about your memory: no  Normal    Patient Care Team   Patient Care Team:  Bekah Mckeon MD as PCP - General (Internal Medicine)  Neena Peoples RN as Ambulatory Care Navigator  Jeyson Mendenhall LPN as Ambulatory Care Navigator    Assessment/Plan   Education and counseling provided:  Are appropriate based on today's review and evaluation  End-of-Life planning (with patient's consent)    Diagnoses and all orders for this visit:    1.  Medicare annual wellness visit, subsequent      Health Maintenance Due   Topic Date Due    GLAUCOMA SCREENING Q2Y  06/17/2017

## 2018-02-22 NOTE — ACP (ADVANCE CARE PLANNING)
Advance Care Planning (ACP) Provider Conversation Snapshot    Date of ACP Conversation: 02/22/18  Persons included in Conversation:  patient  Length of ACP Conversation in minutes:  <16 minutes (Non-Billable)    Authorized Decision Maker (if patient is incapable of making informed decisions):    This person is:   jackson Chauhan Justin, [phone]          For Patients with Decision Making Capacity:   pt is DNR, has dDNR    Conversation Outcomes / Follow-Up Plan:   Recommended completion of Advance Directive form after review of ACP materials and conversation with prospective healthcare agent

## 2018-03-05 NOTE — TELEPHONE ENCOUNTER
Patients son wanted to inform Provider that his mother fell and fractured her pelvis. Patient is currently in [facility].   Patient may be discharged today to a [facility]

## 2018-03-06 PROBLEM — Z87.81 HISTORY OF PELVIC FRACTURE: Status: ACTIVE | Noted: 2018-01-01

## 2018-03-28 NOTE — PROGRESS NOTES
Assessment/Plan:    1. Closed nondisplaced fracture of pelvis, unspecified part of pelvis, initial encounter ([facility] [address]  -refilled. F/u in 1 mo. - morphine CR (MS CONTIN) 15 mg CR tablet; Take 1 Tab by mouth every twelve (12) hours. Max Daily Amount: 30 mg. Dispense: 60 Tab; Refill: 0    2. Hypotension due to drugs  -decrease losartan to 12.5mg. Ideally would like to keep ARB onboard given systolic CMO. 3. Systolic CHF, chronic (HCC)  -EF 20%, compensated    4. Essential hypertension  -low. See #2    The plan was discussed with the patient. The patient verbalized understanding and is in agreement with the plan. All medication potential side effects were discussed with the patient. Health Maintenance:   Health Maintenance   Topic Date Due    GLAUCOMA SCREENING Q2Y  06/17/2017    MEDICARE YEARLY EXAM  02/23/2019    DTaP/Tdap/Td series (2 - Td) 01/19/2027    Bone Densitometry (Dexa) Screening  Completed    ZOSTER VACCINE AGE 60>  Completed    Pneumococcal 65+ Low/Medium Risk  Completed    Influenza Age 5 to Adult  Completed       Mariaa Yi is a 80 y.o. female and presents with Transitions Of Care (From Rehab)     Subjective:  Pt sustained a fall while taking in the trash can on 3/2. Was found to have pelvis fracture. Ortho recommended conservative management. Is getting PT/OT at home. Was prescribed morphine ER 15mg bid. Her pain is well controlled on this regimen and she doesn't need the short acting percocet. She is having hypotension. Has known CHF, EF 20%. The home therapist hasn't been able to get bp. No dizziness. No dyspnea. ROS:  Constitutional: No recent weight change. No weakness/fatigue. No f/c. Cardiovascular: No CP/palpitations. No RODRIGEZ/orthopnea/PND. Respiratory: No cough/sputum, dyspnea, wheezing. Gastointestinal: No dysphagia, reflux. No n/v. No constipation/diarrhea. No melena/rectal bleeding.      The problem list was updated as a part of today's visit. Patient Active Problem List   Diagnosis Code    HLD (hyperlipidemia) E78.5    Osteopenia M85.80    Vitamin D deficiency E55.9    Post herpetic neuralgia B02.29    Basal cell cancer C44.91    Refusal of statin medication by patient Z53.29    Hearing loss H91.90    Hypothyroidism (acquired) E03.9    Essential hypertension I10    Acute on chronic systolic congestive heart failure (HCC) I50.23    Primary osteoarthritis involving multiple joints M15.0    Controlled substance agreement signed Z79.899    Diverticulitis K57.92    Colonic mass K63.9    DNR (do not resuscitate) Z66    History of pelvic fracture Z87.81       The PSH, FH were reviewed. SH:  Social History   Substance Use Topics    Smoking status: Former Smoker     Packs/day: 1.00     Years: 50.00     Start date: 1/1/1944     Quit date: 1/1/1994    Smokeless tobacco: Never Used    Alcohol use 0.0 oz/week     0 Standard drinks or equivalent per week      Comment: seldom       Medications/Allergies:  Current Outpatient Prescriptions on File Prior to Visit   Medication Sig Dispense Refill    oxyCODONE-acetaminophen (PERCOCET) 5-325 mg per tablet Take 1 Tab by mouth daily as needed for Pain for up to 90 days. 90 Tab 0    levothyroxine (SYNTHROID) 50 mcg tablet Take 1 Tab by mouth Daily (before breakfast). 90 Tab 3    losartan (COZAAR) 25 mg tablet Take  by mouth daily.  furosemide (LASIX) 40 mg tablet Take  by mouth daily.  carvedilol (COREG) 6.25 mg tablet Take  by mouth two (2) times daily (with meals).  simvastatin (ZOCOR) 20 mg tablet Take  by mouth nightly.  multivitamins-ca-iron-minerals (MULTIPLE VITAMIN, WOMENS) tab Take  by mouth.  aspirin delayed-release 81 mg tablet Take 1 Tab by mouth daily. 30 Tab      No current facility-administered medications on file prior to visit.          Allergies   Allergen Reactions    Ace Inhibitors Cough       Objective:  Visit Vitals    BP 92/58 (BP 1 Location: Left arm, BP Patient Position: Sitting)  Comment: patient machine 95/53    Pulse 74    Temp 97.7 °F (36.5 °C) (Oral)    Resp 20    Ht 5' 2\" (1.575 m)    Wt 140 lb (63.5 kg)    SpO2 97%    BMI 25.61 kg/m2      Constitutional: Well developed, nourished, no distress, alert   CV: S1, S2.  RRR.  2/6 OBDULIO MICHELLE with radiation to axilla. No thrills palpated. No carotid bruits. Intact distal pulses. 1+ pretibial pitting edema. Pulm: No abnormalities on inspection. Clear to auscultation bilaterally. No wheezing/rhonchi. Normal effort. GI: Soft, nontender, nondistended. Normal active bowel sounds. MS: In wheelchair     Psych: Appropriate affect, judgement and insight. Short-term memory intact.

## 2018-03-28 NOTE — PROGRESS NOTES
Ashly Escobedo is a 80 y.o. female (: 1926) presenting to address:    Chief Complaint   Patient presents with   [place]     From Rehab       Vitals:    18 1354   BP: 92/58   Pulse: 74   Resp: 20   Temp: 97.7 °F (36.5 °C)   TempSrc: Oral   SpO2: 97%   Weight: 140 lb (63.5 kg)   Height: 5' 2\" (1.575 m)   PainSc:   0 - No pain   patient machine- 95/53 -left arm      Learning Assessment:     Learning Assessment 2014   PRIMARY LEARNER Patient   HIGHEST LEVEL OF EDUCATION - PRIMARY LEARNER  4 YEARS OF COLLEGE   BARRIERS PRIMARY LEARNER NONE   CO-LEARNER CAREGIVER No   PRIMARY LANGUAGE ENGLISH   LEARNER PREFERENCE PRIMARY OTHER (COMMENT)   ANSWERED BY self   RELATIONSHIP SELF     Depression Screening:     PHQ over the last two weeks 2018   Little interest or pleasure in doing things Not at all   Feeling down, depressed or hopeless Not at all   Total Score PHQ 2 0   Trouble falling or staying asleep, or sleeping too much -   Feeling tired or having little energy -   Poor appetite or overeating -   Feeling bad about yourself - or that you are a failure or have let yourself or your family down -   Trouble concentrating on things such as school, work, reading or watching TV -   Moving or speaking so slowly that other people could have noticed; or the opposite being so fidgety that others notice -   Thoughts of being better off dead, or hurting yourself in some way -   PHQ 9 Score -     Fall Risk Assessment:     Fall Risk Assessment, last 12 mths 2018   Able to walk? Yes   Fall in past 12 months? No   Fall with injury? -   Number of falls in past 12 months -   Fall Risk Score -     Abuse Screening:     Abuse Screening Questionnaire 2015   Do you ever feel afraid of your partner? N   Are you in a relationship with someone who physically or mentally threatens you? N   Is it safe for you to go home? Y     Coordination of Care Questionaire:   1.  Have you been to the ER, urgent care clinic since your last visit? Hospitalized since your last visit? YES [facility] 3/2/18, rehab 3/23/18    2. Have you seen or consulted any other health care providers outside of the [address] [place] [address] since your last visit? Include any pap smears or colon screening. NO    Advanced Directive:   1. Do you have an Advanced Directive? YES    2. Would you like information on Advanced Directives?  NO

## 2018-03-28 NOTE — MR AVS SNAPSHOT
[address] [place] [address]  [address] [facility] [address] [phone] Patient: Raquel Ramirez MRN: [id number] Visit Information Date & Time Provider Department Dept. Phone Encounter #  
 3/28/2018  2:00 PM Oscar Laguna MD 3 [facility] [id number] Upcoming Health Maintenance Date Due  
 GLAUCOMA SCREENING Q2Y 2017 MEDICARE YEARLY EXAM 2019 DTaP/Tdap/Td series (2 - Td) 2027 Allergies as of 3/28/2018  Review Complete On: 3/28/2018 By: Robin Diego Severity Noted Reaction Type Reactions Ace Inhibitors  2016    Cough Current Immunizations  Reviewed on 2015 Name Date Influenza High Dose Vaccine PF 2016 10:42 AM, 2015 10:38 AM  
 Influenza Vaccine 2016 12:00 AM, 2009 12:00 AM, 2008 12:00 AM  
 Influenza Vaccine (Quad) 2013 12:00 AM, 2011 12:00 AM  
 Influenza Vaccine PF 10/6/2017 12:00 AM  
 Novel Influenza-H1N1-09, All Formulations 2009 12:00 AM  
 Pneumococcal Polysaccharide (PPSV-23) 2007 12:00 AM  
 Zoster Vaccine, Live 5/10/2011 12:00 AM  
  
 Not reviewed this visit You Were Diagnosed With   
  
 Codes Comments Closed nondisplaced fracture of pelvis, unspecified part of pelvis, initial encounter ([facility] [address]    -  Primary ICD-10-CM: S32. 9XXA ICD-9-CM: 808.8 Hypotension due to drugs     ICD-10-CM: I95.2 ICD-9-CM: 458.8, E947.9 Systolic CHF, chronic (HCC)     ICD-10-CM: I50.22 ICD-9-CM: 428.22, 428.0 Essential hypertension     ICD-10-CM: I10 
ICD-9-CM: 401.9 Vitals BP Pulse Temp Resp Height(growth percentile) Weight(growth percentile) 92/58 (BP 1 Location: Left arm, BP Patient Position: Sitting) 74 97.7 °F (36.5 °C) (Oral) 20 5' 2\" (1.575 m) 140 lb (63.5 kg) SpO2 BMI OB Status Smoking Status 97% 25.61 kg/m2 Postmenopausal Former Smoker Vitals History BMI and BSA Data Body Mass Index Body Surface Area  
 25.61 kg/m 2 1.67 m 2 Preferred Pharmacy Pharmacy Name Phone [phone] [phone] Your Updated Medication List  
  
   
This list is accurate as of 3/28/18  2:23 PM.  Always use your most recent med list.  
  
  
  
  
 aspirin delayed-release 81 mg tablet Take 1 Tab by mouth daily. carvedilol 6.25 mg tablet Commonly known as:  Laneta Rucks Take  by mouth two (2) times daily (with meals). furosemide 40 mg tablet Commonly known as:  LASIX Take  by mouth daily. levothyroxine 50 mcg tablet Commonly known as:  SYNTHROID Take 1 Tab by mouth Daily (before breakfast). losartan 25 mg tablet Commonly known as:  COZAAR Take 0.5 Tabs by mouth daily. morphine CR 15 mg CR tablet Commonly known as:  MS CONTIN Take 1 Tab by mouth every twelve (12) hours. Max Daily Amount: 30 mg. MULTIPLE VITAMIN, WOMENS Tab Generic drug:  multivitamins-ca-iron-minerals Take  by mouth. oxyCODONE-acetaminophen 5-325 mg per tablet Commonly known as:  PERCOCET Take 1 Tab by mouth daily as needed for Pain for up to 90 days. TYLENOL 325 mg tablet Generic drug:  acetaminophen Take  by mouth every four (4) hours as needed for Pain. ZOCOR 20 mg tablet Generic drug:  simvastatin Take  by mouth nightly. Prescriptions Printed Refills  
 morphine CR (MS CONTIN) 15 mg CR tablet 0 Sig: Take 1 Tab by mouth every twelve (12) hours. Max Daily Amount: 30 mg.  
 Class: Print Route: Oral  
  
Introducing [facility]! Dear Nelly Morales: 
Thank you for requesting a [employer] account. Our records indicate that you already have an active [employer] account. You can access your account anytime at [email or web address] Did you know that you can access your hospital and ER discharge instructions at any time in Everywun? You can also review all of your test results from your hospital stay or ER visit. Additional Information If you have questions, please visit the Frequently Asked Questions section of the Everywun website at [email or web address] Remember, Everywun is NOT to be used for urgent needs. For medical emergencies, dial 911. Now available from your iPhone and Android! Please provide this summary of care documentation to your next provider. Your primary care clinician is listed as Nanda Smith. If you have any questions after today's visit, please call [phone].

## 2018-04-19 NOTE — TELEPHONE ENCOUNTER
Pt's diarrhea has resolved, she took 2 tabs yesterday. Pt asked if she should take more doses today. No, not unless diarrhea returns. Pt verbalized understanding.

## 2018-04-19 NOTE — TELEPHONE ENCOUNTER
Called pt she states she is having diarrhea for 2 days and she is taking Immodium which is working well. Advised pt she can continue otc immodium, She had a knock at the door and asking if she call me back.

## 2018-04-30 NOTE — TELEPHONE ENCOUNTER
From: Marvin Romo  To: Francesco Franz MD  Sent: 4/29/2018 4:10 PM EDT  Subject: Medication Renewal Request    Original authorizing provider: MD Marvin Richardson would like a refill of the following medications:  levothyroxine (SYNTHROID) 50 mcg tablet Francesco Franz MD]  oxyCODONE-acetaminophen (PERCOCET) 5-325 mg per tablet Francesco Franz MD]  morphine CR (MS CONTIN) 15 mg CR tablet Francesco Franz MD]  losartan (COZAAR) 25 mg tablet Francesco Franz MD]    Preferred pharmacy: [facility] [address] [address] [address]  [address]    Comment:  mix up at return home from rehab and home and at home. cannot relocate some.  also cancellation of scheduled appointment by doctor's office from April to May Nesha

## 2018-05-01 NOTE — TELEPHONE ENCOUNTER
Noni Constantino called to report on the pt. She is complaining of left side abdominal pain and cramps. She does have the pain medication that Dr Elicia Zuluaga prescribed her but she is still complaining of pain. He bp when she got there was 100/50 and in the bp cuff it was 111/42. She states that the pt is not eating or drinking a lot and that she is not wanting to do her exercises because of the pain. She describes the patient has being very flustered and frazzled. Please advise.      Amy Wu [phone]    Inocenciomesfin Camiloconor (pt's son) [phone]

## 2018-05-02 NOTE — TELEPHONE ENCOUNTER
Spoke to [place] , physical therapist. She states OT found pt on the floor and son to her to [facility]. She believes she was admitted for dehydration.

## 2018-05-07 ENCOUNTER — PATIENT OUTREACH (OUTPATIENT)
Dept: FAMILY MEDICINE CLINIC | Age: 83
End: 2018-05-07